# Patient Record
Sex: MALE | Race: WHITE | NOT HISPANIC OR LATINO | Employment: UNEMPLOYED | URBAN - METROPOLITAN AREA
[De-identification: names, ages, dates, MRNs, and addresses within clinical notes are randomized per-mention and may not be internally consistent; named-entity substitution may affect disease eponyms.]

---

## 2024-04-24 ENCOUNTER — MEDICAL CORRESPONDENCE (OUTPATIENT)
Dept: HEALTH INFORMATION MANAGEMENT | Facility: CLINIC | Age: 44
End: 2024-04-24

## 2024-04-25 ENCOUNTER — TRANSCRIBE ORDERS (OUTPATIENT)
Dept: OTHER | Age: 44
End: 2024-04-25

## 2024-04-25 DIAGNOSIS — G24.9 DYSTONIA: Primary | ICD-10-CM

## 2024-04-25 DIAGNOSIS — M62.9 MUSCLE DISORDER: ICD-10-CM

## 2024-05-01 ENCOUNTER — TRANSFERRED RECORDS (OUTPATIENT)
Dept: HEALTH INFORMATION MANAGEMENT | Facility: CLINIC | Age: 44
End: 2024-05-01

## 2024-05-02 ENCOUNTER — TRANSFERRED RECORDS (OUTPATIENT)
Dept: HEALTH INFORMATION MANAGEMENT | Facility: CLINIC | Age: 44
End: 2024-05-02

## 2024-07-28 ENCOUNTER — HEALTH MAINTENANCE LETTER (OUTPATIENT)
Age: 44
End: 2024-07-28

## 2024-08-23 ENCOUNTER — TRANSFERRED RECORDS (OUTPATIENT)
Dept: HEALTH INFORMATION MANAGEMENT | Facility: CLINIC | Age: 44
End: 2024-08-23
Payer: COMMERCIAL

## 2024-08-23 NOTE — TELEPHONE ENCOUNTER
Action 8/23/24 MV 1.06pm   Action Taken Imaging request faxed to [facility] and [facility] imaging fax # [phone]     Action 8/28/24 MV 11.31am   Action Taken [facility] images resolved in PACS         RECORDS RECEIVED FROM: external   REASON FOR VISIT: Muscle disorder    PROVIDER: Dr. Holm   DATE OF APPT: 9/5/24   NOTES (FOR ALL VISITS) STATUS DETAILS   OFFICE NOTE from referring provider Care Everywhere Dr Rupali Sheets @ [facility] Pain:  4/12/24  10/25/23  (Additional encounters)   OFFICE NOTE from other specialist Care Everywhere Dr Yamile Christy @ [facility]:  7/17/24  4/11/24  10/9/23  5/22/23    Dr Flynn Au @ [facility]:  5/23/24 5/21/24   [facility] Care Everywhere [place]:  5/23/24   MEDICATION LIST Care Everywhere    IMAGING  (FOR ALL VISITS)     MRI (HEAD, NECK, SPINE) PACS [facility]:  MRI Thoracic Spine 5/2/24  MRI Cervical Spine 1/15/24    [facility]:  MRI Brachial Plexus 6/29/23

## 2024-08-26 NOTE — TELEPHONE ENCOUNTER
Imaging DISC Received  August 26, 2024 12:22 PM AW   Action: Imaging disc from [place] received and taken to [facility] for upload.    Imaging records sent to [facility] urgent

## 2024-09-05 ENCOUNTER — PRE VISIT (OUTPATIENT)
Dept: NEUROLOGY | Facility: CLINIC | Age: 44
End: 2024-09-05